# Patient Record
Sex: FEMALE | Race: WHITE | NOT HISPANIC OR LATINO
[De-identification: names, ages, dates, MRNs, and addresses within clinical notes are randomized per-mention and may not be internally consistent; named-entity substitution may affect disease eponyms.]

---

## 2022-08-08 ENCOUNTER — APPOINTMENT (OUTPATIENT)
Dept: ORTHOPEDIC SURGERY | Facility: CLINIC | Age: 52
End: 2022-08-08

## 2022-08-08 VITALS — HEIGHT: 63 IN | BODY MASS INDEX: 28.35 KG/M2 | WEIGHT: 160 LBS

## 2022-08-08 DIAGNOSIS — Z00.00 ENCOUNTER FOR GENERAL ADULT MEDICAL EXAMINATION W/OUT ABNORMAL FINDINGS: ICD-10-CM

## 2022-08-08 PROCEDURE — 73630 X-RAY EXAM OF FOOT: CPT | Mod: 50

## 2022-08-08 PROCEDURE — 73600 X-RAY EXAM OF ANKLE: CPT | Mod: 50

## 2022-08-08 PROCEDURE — 99213 OFFICE O/P EST LOW 20 MIN: CPT

## 2022-08-08 PROCEDURE — 99214 OFFICE O/P EST MOD 30 MIN: CPT

## 2022-08-08 NOTE — ASSESSMENT
[FreeTextEntry1] : MRI LT foot to evaluate for occult 3rd metatarsal fracture.\par \par WBAT in CAM boot (patient has at home which she will wear).\par \par Patient was instructed that they can not operate an automatic vehicle while wearing a CAM boot or cast on the right lower extremity. If operating a vehicle that requires use of a clutch, patient may not drive while wearing a CAM boot or cast on the left lower extremity.\par \par PT for RT achilles tendinitis but will wait for results of MRI LT foot. If positive for fracture would recommend stretching/ROM exercises on RT only. If negative for fracture on the LT, formal PT for both feet would be recommended. \par \par Progress Note completed by Lester Rich PA-C\par * Dr. Palomares - The documentation recorded accurately reflects the physical exam performed, decisions made and plan formulated by me during this visit.\par

## 2022-08-08 NOTE — HISTORY OF PRESENT ILLNESS
[Gradual] : gradual [Result of repetitive motion] : result of repetitive motion [10] : 10 [9] : 9 [Localized] : localized [Sharp] : sharp [Constant] : constant [Household chores] : household chores [Leisure] : leisure [Work] : work [Sleep] : sleep [Social interactions] : social interactions [Rest] : rest [Sitting] : sitting [Standing] : standing [Walking] : walking [de-identified] : Pt. is a 51 year old female who presents for evaluation of both feet. She reports intermittent LT foot pain x years, typically every summer. Denies trauma. She reports swelling and pain on the LT since early August 2022. RT posterior ankle pain since late 2021. No trauma. She is typically under care of podiatry for foot issues and provided steroid injections. H/O B/L ankle fractures which healed well, ORIF on the RT, non-op on the LT.  WB without assistive device. No formal treatment to date.  [] : Post Surgical Visit: no [FreeTextEntry1] : B/L Feet

## 2022-08-08 NOTE — PHYSICAL EXAM
[Left] : left foot and ankle [Mild] : mild swelling of dorsal foot [3rd] : 3rd [NL (40)] : plantar flexion 40 degrees [NL 30)] : inversion 30 degrees [NL (20)] : eversion 20 degrees [5___] : [facility] 5[unfilled]/5 [2+] : posterior tibialis pulse: 2+ [Normal] : saphenous nerve sensation normal [Weight -] : weightbearing [Right] : right toe [] : non-antalgic [FreeTextEntry9] : Evidence of previous ALEXY on the RT with some mild degenerative changes tibiotalar joint.  [de-identified] : B/L plantar heel spur, B/L achilles insertion spur, B/L Angelica's deformity. [TWNoteComboBox7] : dorsiflexion 15 degrees

## 2022-08-26 ENCOUNTER — APPOINTMENT (OUTPATIENT)
Dept: ORTHOPEDIC SURGERY | Facility: CLINIC | Age: 52
End: 2022-08-26

## 2022-08-26 VITALS — BODY MASS INDEX: 28.35 KG/M2 | HEIGHT: 63 IN | WEIGHT: 160 LBS

## 2022-08-26 DIAGNOSIS — S92.325A NONDISPLACED FRACTURE OF SECOND METATARSAL BONE, LEFT FOOT, INITIAL ENCOUNTER FOR CLOSED FRACTURE: ICD-10-CM

## 2022-08-26 DIAGNOSIS — S92.346A: ICD-10-CM

## 2022-08-26 DIAGNOSIS — M76.61 ACHILLES TENDINITIS, RIGHT LEG: ICD-10-CM

## 2022-08-26 PROCEDURE — 99214 OFFICE O/P EST MOD 30 MIN: CPT | Mod: 57

## 2022-08-26 PROCEDURE — 28470 CLTX METATARSAL FX WO MNP EA: CPT | Mod: LT

## 2022-08-26 NOTE — PHYSICAL EXAM
[Left] : left foot and ankle [Mild] : mild swelling of dorsal foot [3rd] : 3rd [NL (40)] : plantar flexion 40 degrees [NL 30)] : inversion 30 degrees [NL (20)] : eversion 20 degrees [2+] : posterior tibialis pulse: 2+ [Normal] : saphenous nerve sensation normal [Weight -] : weightbearing [Right] : right toe [FreeTextEntry9] : Evidence of previous ALEXY on the RT with some mild degenerative changes tibiotalar joint.  [de-identified] : B/L plantar heel spur, B/L achilles insertion spur, B/L Angelica's deformity. [2nd] : 2nd [5___] : [facility] 5[unfilled]/5 [] : non-antalgic [TWNoteComboBox7] : dorsiflexion 15 degrees

## 2022-08-26 NOTE — HISTORY OF PRESENT ILLNESS
[Gradual] : gradual [Result of repetitive motion] : result of repetitive motion [Localized] : localized [Constant] : constant [Household chores] : household chores [Leisure] : leisure [Work] : work [Sleep] : sleep [Social interactions] : social interactions [Rest] : rest [Sitting] : sitting [Standing] : standing [Walking] : walking [4] : 4 [0] : 0 [Shooting] : shooting [de-identified] : Patient returns for her left foot pain from early August 2022, as well as her right posterior ankle pain since late 2021.   She has been wb in a cam walker on the left with some improvement.  LHR MRI reviewed by report and imaging showed 2nd and 4th metatarsal fractures.  Her right achilles tendonitis pain has essentially resolved. [] : Post Surgical Visit: no [FreeTextEntry1] : B/L Feet

## 2022-08-26 NOTE — ASSESSMENT
[FreeTextEntry1] : Patient will continue to be wbat in the cam walker on the left.\par Ice and slight heel lift for the right is encouraged.\par Nsaids prn.\par \par Repeat x-ray of the left foot will be performed at the next office visit.\par

## 2022-09-16 ENCOUNTER — APPOINTMENT (OUTPATIENT)
Dept: ORTHOPEDIC SURGERY | Facility: CLINIC | Age: 52
End: 2022-09-16

## 2022-09-16 DIAGNOSIS — S92.345D NONDISPLACED FRACTURE OF FOURTH METATARSAL BONE, LEFT FOOT, SUBSEQUENT ENCOUNTER FOR FRACTURE WITH ROUTINE HEALING: ICD-10-CM

## 2022-09-16 DIAGNOSIS — S92.325D NONDISPLACED FRACTURE OF SECOND METATARSAL BONE, LEFT FOOT, SUBSEQUENT ENCOUNTER FOR FRACTURE WITH ROUTINE HEALING: ICD-10-CM

## 2022-09-16 PROCEDURE — 99024 POSTOP FOLLOW-UP VISIT: CPT

## 2022-09-16 PROCEDURE — 73630 X-RAY EXAM OF FOOT: CPT | Mod: LT

## 2022-09-16 NOTE — ASSESSMENT
[FreeTextEntry1] : Patient has healed her fractures and no longer needs the cam walker.  Supportive shoes recommended.\par No high impact activities for two weeks and then can slowly resume as tolerated.\par

## 2022-09-16 NOTE — HISTORY OF PRESENT ILLNESS
[Gradual] : gradual [Result of repetitive motion] : result of repetitive motion [4] : 4 [0] : 0 [Localized] : localized [Shooting] : shooting [Constant] : constant [Household chores] : household chores [Leisure] : leisure [Work] : work [Sleep] : sleep [Social interactions] : social interactions [Rest] : rest [Sitting] : sitting [Standing] : standing [Walking] : walking [de-identified] : Patient returns for her left foot pain from early August 2022.   She has been wb in a cam walker on the left with  great improvement.  LHR MRI reviewed by report and imaging showed 2nd and 4th metatarsal fractures.  Her right achilles tendonitis pain has  resolved. [] : Post Surgical Visit: no [FreeTextEntry1] : B/L Feet

## 2022-09-16 NOTE — PHYSICAL EXAM
[Mild] : mild swelling of dorsal foot [Right] : right foot and ankle [NL (40)] : plantar flexion 40 degrees [NL 30)] : inversion 30 degrees [NL (20)] : eversion 20 degrees [2+] : posterior tibialis pulse: 2+ [Normal] : saphenous nerve sensation normal [5___] : [facility] 5[unfilled]/5 [] : non-antalgic [Left] : left foot [Weight -] : weightbearing [There are no fractures, subluxations or dislocations. No significant abnormalities are seen] : There are no fractures, subluxations or dislocations. No significant abnormalities are seen [de-identified] : Increased opacity and lateral periosteal reaction at 2nd metatarsal shaft consistent with healed fracture. [TWNoteComboBox7] : dorsiflexion 15 degrees

## 2023-04-21 ENCOUNTER — APPOINTMENT (OUTPATIENT)
Dept: ORTHOPEDIC SURGERY | Facility: CLINIC | Age: 53
End: 2023-04-21
Payer: COMMERCIAL

## 2023-04-21 DIAGNOSIS — M77.8 OTHER ENTHESOPATHIES, NOT ELSEWHERE CLASSIFIED: ICD-10-CM

## 2023-04-21 DIAGNOSIS — I10 ESSENTIAL (PRIMARY) HYPERTENSION: ICD-10-CM

## 2023-04-21 DIAGNOSIS — S96.912A STRAIN OF UNSPECIFIED MUSCLE AND TENDON AT ANKLE AND FOOT LEVEL, LEFT FOOT, INITIAL ENCOUNTER: ICD-10-CM

## 2023-04-21 PROCEDURE — 73630 X-RAY EXAM OF FOOT: CPT | Mod: LT

## 2023-04-21 PROCEDURE — 99213 OFFICE O/P EST LOW 20 MIN: CPT

## 2023-04-21 PROCEDURE — 99214 OFFICE O/P EST MOD 30 MIN: CPT

## 2023-04-21 NOTE — HISTORY OF PRESENT ILLNESS
[Gradual] : gradual [Result of repetitive motion] : result of repetitive motion [4] : 4 [0] : 0 [Localized] : localized [Shooting] : shooting [Constant] : constant [Household chores] : household chores [Leisure] : leisure [Work] : work [Sleep] : sleep [Social interactions] : social interactions [Rest] : rest [Sitting] : sitting [Standing] : standing [Walking] : walking [de-identified] : Patient is a 52 year old female presents with left dorsal midfoot pain since early April, 2023.  No trauma.  She is able to wb without difficulty.  She is s/p MRI diagnosed  2nd and 4th metatarsal fractures from August, 2022.  She had been fine until the recent pain started. [] : Post Surgical Visit: no [FreeTextEntry1] : B/L Feet

## 2023-04-21 NOTE — PHYSICAL EXAM
[Right] : right foot and ankle [NL (40)] : plantar flexion 40 degrees [NL 30)] : inversion 30 degrees [NL (20)] : eversion 20 degrees [5___] : eversion 5[unfilled]/5 [2+] : posterior tibialis pulse: 2+ [Normal] : saphenous nerve sensation normal [Left] : left foot [Weight -] : weightbearing [There are no fractures, subluxations or dislocations. No significant abnormalities are seen] : There are no fractures, subluxations or dislocations. No significant abnormalities are seen [] : no pain when stressing lateral tarsal metatarsal joint [de-identified] : Same lateral periosteal reaction at 2nd metatarsal shaft consistent with healed fracture.  No change from prior. [TWNoteComboBox7] : dorsiflexion 15 degrees

## 2024-02-19 ENCOUNTER — APPOINTMENT (OUTPATIENT)
Dept: ORTHOPEDIC SURGERY | Facility: CLINIC | Age: 54
End: 2024-02-19
Payer: COMMERCIAL

## 2024-02-19 VITALS — BODY MASS INDEX: 28.35 KG/M2 | HEIGHT: 63 IN | WEIGHT: 160 LBS

## 2024-02-19 VITALS — WEIGHT: 160 LBS | BODY MASS INDEX: 28.35 KG/M2 | HEIGHT: 63 IN

## 2024-02-19 DIAGNOSIS — Z78.9 OTHER SPECIFIED HEALTH STATUS: ICD-10-CM

## 2024-02-19 PROCEDURE — 73130 X-RAY EXAM OF HAND: CPT | Mod: RT

## 2024-02-19 PROCEDURE — 99213 OFFICE O/P EST LOW 20 MIN: CPT | Mod: 25

## 2024-02-19 RX ORDER — METHYLPREDNISOLONE 4 MG/1
4 TABLET ORAL
Qty: 1 | Refills: 0 | Status: ACTIVE | COMMUNITY
Start: 2024-02-19 | End: 1900-01-01

## 2024-02-19 NOTE — IMAGING
[de-identified] : RIGHT HAND nodular enlargement of DIPJ of IF/MF/RF/SF. skin intact. moderate swelling of wrist. TTP to dorsal carpus. wrist ROM: scant ROM. + pain with all wrist ROM. good EPL, FPL. good finger extension, flex to mid-palm. good finger abduction and adduction.  SILT to median, ulnar, radial distribution.  palpable radial pulse, brisk cap refill all digits. no triggering.   XRAYS OF RIGHT HAND: no acute displaced fracture or dislocation. djd of DIPJ of index, middle, ring, small fingers. calcifications radial to index finger PIPJ, ulnar to 4th metacarpal head, adjacent to triquetrum.

## 2024-02-19 NOTE — REASON FOR VISIT
[FreeTextEntry2] : 02/19/2024 :MITA VELA , a 53 year old female, presents today for  right hand pain 71

## 2024-02-19 NOTE — ASSESSMENT
[FreeTextEntry1] : The condition was explained to the patient. - prescribed MDP. - applied ACE wrap to wrist PRN comfort. - elevate wrist above level of heart to reduce swelling. - activity modification PRN.  F/u 2 weeks.  Also recommend f/u with Rheumatologist for re-evaluation of autoimmune/inflammatory condition.

## 2024-03-08 ENCOUNTER — APPOINTMENT (OUTPATIENT)
Dept: ORTHOPEDIC SURGERY | Facility: CLINIC | Age: 54
End: 2024-03-08
Payer: COMMERCIAL

## 2024-03-08 DIAGNOSIS — M65.9 SYNOVITIS AND TENOSYNOVITIS, UNSPECIFIED: ICD-10-CM

## 2024-03-08 PROCEDURE — 99213 OFFICE O/P EST LOW 20 MIN: CPT

## 2024-03-08 RX ORDER — MELOXICAM 15 MG/1
15 TABLET ORAL
Qty: 30 | Refills: 2 | Status: ACTIVE | COMMUNITY
Start: 2024-03-08 | End: 1900-01-01

## 2024-03-08 NOTE — ASSESSMENT
[FreeTextEntry1] : - prescribed Meloxicam PRN pain. Take with food. Reviewed contra-indicated medical conditions (eg liver disease, kidney disease, or GI ulcer/bleeding) or medications (eg blood thinners). Discussed possible GI and blood pressure side effects.  - recommend f/u with PCP vs Rheumatologist for medical management of gout.  F/u PRN.

## 2024-03-08 NOTE — HISTORY OF PRESENT ILLNESS
[de-identified] : 3/8/24: f/u RIGHT wrist synovitis. completed MDP, reports pain is much better, has some residual volar radial wrist pain.  2/19/24: 52yo RHD female ( for [employer] firm) presents for RIGHT dorsal/volar hand/wrist pain and stiffness x 3 days. Reports similar episode of pain, but over ulnar wrist ~2 weeks ago that resolved on its own. c/o throbbing pain when her hand hangs down. Reports that she develops redness around areas of pain. Noticed tingling in RIGHT small finger today. Denies injury. Unable to tolerate wrist wrap/brace. + Advil, Tylenol without relief. Reports that she has previously seen Rheumatologist => labs were unremarkable.  Hx: Gout. Sx: L distal radius fx ORIF, CTR (Dr. Ananth Perez 4/19/19). Ankles sx. ACL reconstruction. Cholecystectomy. [FreeTextEntry1] : LEFT wrist  [FreeTextEntry5] : MITA is here today to follow up on her RIGHT wrist. pt states since last visit, pain decreased with MDP.

## 2024-03-08 NOTE — IMAGING
[de-identified] : RIGHT HAND nodular enlargement of DIPJ of IF/MF/RF/SF. skin intact. mild diffuse swelling of wrist. mild TTP to volar radiocarpal joint. wrist ROM: mild limited extension, flexion. good EPL, FPL. good finger extension, flex to mid-palm. good finger abduction and adduction.  SILT to median, ulnar, radial distribution.  palpable radial pulse, brisk cap refill all digits. no triggering.